# Patient Record
Sex: MALE | Race: OTHER
[De-identification: names, ages, dates, MRNs, and addresses within clinical notes are randomized per-mention and may not be internally consistent; named-entity substitution may affect disease eponyms.]

---

## 2024-03-30 ENCOUNTER — HOSPITAL ENCOUNTER (EMERGENCY)
Dept: HOSPITAL 54 - ER | Age: 34
Discharge: HOME | End: 2024-03-30
Payer: COMMERCIAL

## 2024-03-30 VITALS — BODY MASS INDEX: 22.73 KG/M2 | HEIGHT: 68 IN | WEIGHT: 150 LBS

## 2024-03-30 VITALS — DIASTOLIC BLOOD PRESSURE: 74 MMHG | OXYGEN SATURATION: 98 % | SYSTOLIC BLOOD PRESSURE: 126 MMHG | TEMPERATURE: 97.9 F

## 2024-03-30 DIAGNOSIS — F41.9: Primary | ICD-10-CM

## 2024-03-30 RX ADMIN — ALUMINUM HYDROXIDE, MAGNESIUM HYDROXIDE, AND SIMETHICONE ONE ML: 200; 200; 20 SUSPENSION ORAL at 10:23

## 2024-03-30 RX ADMIN — LORAZEPAM ONE MG: 1 TABLET ORAL at 10:22

## 2024-06-03 ENCOUNTER — EMERGENCY (EMERGENCY)
Facility: HOSPITAL | Age: 34
LOS: 1 days | Discharge: LEFT BEFORE DISCHARGE | End: 2024-06-03
Attending: EMERGENCY MEDICINE
Payer: COMMERCIAL

## 2024-06-03 VITALS
HEART RATE: 101 BPM | WEIGHT: 141.76 LBS | DIASTOLIC BLOOD PRESSURE: 86 MMHG | SYSTOLIC BLOOD PRESSURE: 126 MMHG | OXYGEN SATURATION: 98 % | TEMPERATURE: 98 F | RESPIRATION RATE: 17 BRPM

## 2024-06-03 NOTE — ED STATDOCS - ATTENDING APP SHARED VISIT CONTRIBUTION OF CARE
33-year-old male history of IBS and depression presents with nausea vomiting and generalized abdominal pain for the past 4 days.  Patient report that he went to [place] had blood work done and that he returned again then gave the medication with no relief.  Patient signed out AMA the second time.  Patient report occasional marijuana use.  Patient's chart from [facility] found in the [facility] in 2023 have reported multiple episodes of vomiting and was seen in the ED multiple times for vomiting secondary to marijuana use.  I offered blood work and medication.  When I brought up marijuana induced cyclic vomiting patient became upset and  walked out of the ED.

## 2024-06-03 NOTE — ED STATDOCS - CLINICAL SUMMARY MEDICAL DECISION MAKING FREE TEXT BOX
Pt is a 33y M with states hx of IBS/IBD/ depression presenting for nausea/ vomiting and generalized abd pain. Pt reports he gets symptoms monthly and has had this episode x 4 days. Pt denies being able to eat since Friday. He denies following with GI and states he has not seen a specialist in about 4 years. Pt went to [facility] 2x in the last 4 days and had blood work but walked out without being discharged.  He has been taking zofran and Ibuprofen at home. Pt reports "coffee ground emesis" and being constipated. He also admits to vaping and occasional marijuana. He also admits to taking xanax before coming to ED today. Pt is urinating without issue. He denies fever/chills. He appears comfortable in no acute distress. NKDA.  Will order labs/ give fluids/ IV meds and reassess.  Pt eloped prior to any intervention.

## 2024-06-03 NOTE — ED ADULT TRIAGE NOTE - TEMPERATURE IN FAHRENHEIT (DEGREES F)
Quality 226: Preventive Care And Screening: Tobacco Use: Screening And Cessation Intervention: Patient screened for tobacco and never smoked 97.6